# Patient Record
Sex: MALE | Race: WHITE | Employment: FULL TIME | URBAN - METROPOLITAN AREA
[De-identification: names, ages, dates, MRNs, and addresses within clinical notes are randomized per-mention and may not be internally consistent; named-entity substitution may affect disease eponyms.]

---

## 2023-10-31 ENCOUNTER — OFFICE VISIT (OUTPATIENT)
Age: 58
End: 2023-10-31
Payer: COMMERCIAL

## 2023-10-31 VITALS
WEIGHT: 236.8 LBS | TEMPERATURE: 98.6 F | RESPIRATION RATE: 18 BRPM | HEIGHT: 63 IN | BODY MASS INDEX: 41.96 KG/M2 | HEART RATE: 73 BPM | OXYGEN SATURATION: 97 %

## 2023-10-31 DIAGNOSIS — Z12.5 SCREENING FOR PROSTATE CANCER: ICD-10-CM

## 2023-10-31 DIAGNOSIS — R05.8 POST-VIRAL COUGH SYNDROME: ICD-10-CM

## 2023-10-31 DIAGNOSIS — I10 PRIMARY HYPERTENSION: Primary | ICD-10-CM

## 2023-10-31 DIAGNOSIS — Z76.89 ENCOUNTER TO ESTABLISH CARE: ICD-10-CM

## 2023-10-31 DIAGNOSIS — E66.01 CLASS 3 SEVERE OBESITY DUE TO EXCESS CALORIES WITHOUT SERIOUS COMORBIDITY WITH BODY MASS INDEX (BMI) OF 40.0 TO 44.9 IN ADULT (HCC): ICD-10-CM

## 2023-10-31 PROCEDURE — 99204 OFFICE O/P NEW MOD 45 MIN: CPT | Performed by: NURSE PRACTITIONER

## 2023-10-31 RX ORDER — BENZONATATE 200 MG/1
200 CAPSULE ORAL 3 TIMES DAILY PRN
Qty: 30 CAPSULE | Refills: 1 | Status: SHIPPED | OUTPATIENT
Start: 2023-10-31

## 2023-10-31 RX ORDER — ZOSTER VACCINE RECOMBINANT, ADJUVANTED 50 MCG/0.5
0.5 KIT INTRAMUSCULAR SEE ADMIN INSTRUCTIONS
Qty: 0.5 ML | Refills: 0 | Status: SHIPPED | OUTPATIENT
Start: 2023-10-31 | End: 2024-04-28

## 2023-10-31 RX ORDER — VALSARTAN 320 MG/1
320 TABLET ORAL DAILY
COMMUNITY
Start: 2023-09-07

## 2023-10-31 RX ORDER — HYDROCHLOROTHIAZIDE 12.5 MG/1
12.5 CAPSULE, GELATIN COATED ORAL EVERY MORNING
Qty: 90 CAPSULE | Refills: 1 | Status: SHIPPED | OUTPATIENT
Start: 2023-10-31

## 2023-10-31 ASSESSMENT — PATIENT HEALTH QUESTIONNAIRE - PHQ9
SUM OF ALL RESPONSES TO PHQ QUESTIONS 1-9: 0
SUM OF ALL RESPONSES TO PHQ QUESTIONS 1-9: 0
1. LITTLE INTEREST OR PLEASURE IN DOING THINGS: 0
SUM OF ALL RESPONSES TO PHQ9 QUESTIONS 1 & 2: 0
2. FEELING DOWN, DEPRESSED OR HOPELESS: 0
SUM OF ALL RESPONSES TO PHQ QUESTIONS 1-9: 0
SUM OF ALL RESPONSES TO PHQ QUESTIONS 1-9: 0

## 2023-10-31 ASSESSMENT — ENCOUNTER SYMPTOMS
SHORTNESS OF BREATH: 0
WHEEZING: 0
COUGH: 1
CHEST TIGHTNESS: 0
SORE THROAT: 0

## 2024-02-07 ENCOUNTER — OFFICE VISIT (OUTPATIENT)
Age: 59
End: 2024-02-07
Payer: COMMERCIAL

## 2024-02-07 VITALS
DIASTOLIC BLOOD PRESSURE: 71 MMHG | OXYGEN SATURATION: 95 % | RESPIRATION RATE: 18 BRPM | WEIGHT: 235.6 LBS | HEART RATE: 99 BPM | BODY MASS INDEX: 40.22 KG/M2 | TEMPERATURE: 98.8 F | SYSTOLIC BLOOD PRESSURE: 136 MMHG | HEIGHT: 64 IN

## 2024-02-07 DIAGNOSIS — J01.10 ACUTE NON-RECURRENT FRONTAL SINUSITIS: ICD-10-CM

## 2024-02-07 DIAGNOSIS — J40 BRONCHITIS: Primary | ICD-10-CM

## 2024-02-07 PROCEDURE — 3078F DIAST BP <80 MM HG: CPT | Performed by: INTERNAL MEDICINE

## 2024-02-07 PROCEDURE — 99213 OFFICE O/P EST LOW 20 MIN: CPT | Performed by: INTERNAL MEDICINE

## 2024-02-07 PROCEDURE — 3075F SYST BP GE 130 - 139MM HG: CPT | Performed by: INTERNAL MEDICINE

## 2024-02-07 RX ORDER — DOXYCYCLINE HYCLATE 100 MG
100 TABLET ORAL 2 TIMES DAILY
Qty: 14 TABLET | Refills: 0 | Status: SHIPPED | OUTPATIENT
Start: 2024-02-07 | End: 2024-02-14

## 2024-02-07 RX ORDER — ALBUTEROL SULFATE 90 UG/1
2 AEROSOL, METERED RESPIRATORY (INHALATION) EVERY 6 HOURS PRN
Qty: 1 EACH | Refills: 5 | Status: SHIPPED | OUTPATIENT
Start: 2024-02-07

## 2024-02-07 SDOH — ECONOMIC STABILITY: HOUSING INSECURITY
IN THE LAST 12 MONTHS, WAS THERE A TIME WHEN YOU DID NOT HAVE A STEADY PLACE TO SLEEP OR SLEPT IN A SHELTER (INCLUDING NOW)?: NO

## 2024-02-07 SDOH — ECONOMIC STABILITY: INCOME INSECURITY: HOW HARD IS IT FOR YOU TO PAY FOR THE VERY BASICS LIKE FOOD, HOUSING, MEDICAL CARE, AND HEATING?: NOT HARD AT ALL

## 2024-02-07 SDOH — ECONOMIC STABILITY: FOOD INSECURITY: WITHIN THE PAST 12 MONTHS, THE FOOD YOU BOUGHT JUST DIDN'T LAST AND YOU DIDN'T HAVE MONEY TO GET MORE.: NEVER TRUE

## 2024-02-07 ASSESSMENT — PATIENT HEALTH QUESTIONNAIRE - PHQ9
SUM OF ALL RESPONSES TO PHQ9 QUESTIONS 1 & 2: 0
2. FEELING DOWN, DEPRESSED OR HOPELESS: 0
SUM OF ALL RESPONSES TO PHQ QUESTIONS 1-9: 0
1. LITTLE INTEREST OR PLEASURE IN DOING THINGS: 0

## 2024-02-07 ASSESSMENT — ENCOUNTER SYMPTOMS
SHORTNESS OF BREATH: 0
SINUS PAIN: 0
TROUBLE SWALLOWING: 0
RHINORRHEA: 1
EYE PAIN: 0
COUGH: 1

## 2024-02-07 NOTE — PROGRESS NOTES
Yeison Li is a 59 y.o. 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 Saturday he started feeling worse and was Covid -. He has had a temperature from . He is experiencing a productive cough and it was green and the congestion in his nasal cavity was clear and now it's switched. Clear mucus in his chest and green in his nasal cavity, this started happening on Sunday. Patient has been taking Coricidin in the day and night quil at night.        Vitals:    02/07/24 1049   BP: 136/71   Pulse: 99   Resp: 18   Temp: 98.8 °F (37.1 °C)   SpO2: 95%          HPI  Mr.James JINA Li presents to clinic due to low grade fever, sinus headache, nasal congestion with green mucous and cough productive of green phlegm, and post nasal drainage since Friday. He was tested for covid at home nad it it negative x 2. He reports slight muscle ache that improve, denies joint pain. Since Friday, low grade fever, nasaol congestion , sinus pressure     His co worker tested positive for Covid before his symptoms started.     Past Medical History:   Diagnosis Date    Hypertension             ROS  Review of Systems   Constitutional:  Negative for fever.   HENT:  Positive for congestion, postnasal drip and rhinorrhea. Negative for ear pain, sinus pain and trouble swallowing.    Eyes:  Negative for pain.   Respiratory:  Positive for cough. Negative for shortness of breath.    Cardiovascular:  Negative for chest pain.   Skin:  Negative for rash.   Allergic/Immunologic: Negative for environmental allergies.   Neurological:  Positive for headaches. Negative for dizziness.   Hematological:  Negative for adenopathy.   All other systems reviewed and are negative.          EXAM  Physical Exam  Vitals and nursing note reviewed.   Constitutional:       General: He is not in acute distress.     Appearance: Normal appearance. He is

## 2024-02-07 NOTE — PROGRESS NOTES
I have reviewed all needed documentation in preparation for visit. 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 Saturday he started feeling worse and was Covid -. He has had a temperature from . He is experiencing a productive cough and it was green and the congestion in his nasal cavity was clear and now it's switched. Clear mucus in his chest and green in his nasal cavity, this started happening on Sunday. Patient has been taking Coricidin in the day and night quil at night.        There were no vitals filed for this visit.    Health Maintenance Due   Topic Date Due    COVID-19 Vaccine (1) Never done    HIV screen  Never done    Hepatitis C screen  Never done    DTaP/Tdap/Td vaccine (1 - Tdap) Never done    Diabetes screen  Never done    Lipids  Never done    Colorectal Cancer Screen  Never done    Shingles vaccine (1 of 2) Never done    Flu vaccine (1) Never done    Respiratory Syncytial Virus (RSV) Pregnant or age 60 yrs+ (1 - 1-dose 60+ series) Never done       1. \"Have you been to the ER, urgent care clinic since your last visit?  Hospitalized since your last visit?\" No    2. \"Have you seen or consulted any other health care providers outside of the [facility] since your last visit?\" No    3. For patients aged 45-75: Has the patient had a colonoscopy / FIT/ Cologuard? No, needs to schedule.       If the patient is female:    4. For patients aged 40-74: Has the patient had a mammogram within the past 2 years? NA      5. For patients aged 21-65: Has the patient had a pap smear? PRATIBHA Cespedes

## 2024-03-06 DIAGNOSIS — Z20.822 CONTACT WITH AND (SUSPECTED) EXPOSURE TO COVID-19: ICD-10-CM

## 2024-03-08 NOTE — TELEPHONE ENCOUNTER
Refill request received from [facility] for   Requested Prescriptions     Pending Prescriptions Disp Refills    valsartan (DIOVAN) 320 MG tablet [Pharmacy Med Name: VALSARTAN 320 MG TABLET] 90 tablet 3     Sig: TAKE 1 TABLET BY MOUTH EVERY DAY     Last office visit: 2/7/2024   Next office visit: 3/8/2024     Routed to MARTINA Ladd for review.

## 2024-03-12 RX ORDER — VALSARTAN 320 MG/1
320 TABLET ORAL DAILY
Qty: 90 TABLET | Refills: 3 | Status: SHIPPED | OUTPATIENT
Start: 2024-03-12

## 2024-03-27 DIAGNOSIS — M23.51 RECURRENT RIGHT KNEE INSTABILITY: Primary | ICD-10-CM

## 2024-04-29 RX ORDER — HYDROCHLOROTHIAZIDE 12.5 MG/1
12.5 CAPSULE, GELATIN COATED ORAL EVERY MORNING
Qty: 90 CAPSULE | Refills: 3 | Status: SHIPPED | OUTPATIENT
Start: 2024-04-29

## 2024-06-22 ENCOUNTER — PATIENT MESSAGE (OUTPATIENT)
Age: 59
End: 2024-06-22

## 2024-06-22 DIAGNOSIS — M10.00 ACUTE IDIOPATHIC GOUT, UNSPECIFIED SITE: Primary | ICD-10-CM

## 2024-06-25 RX ORDER — INDOMETHACIN 50 MG/1
50 CAPSULE ORAL 3 TIMES DAILY
Qty: 21 CAPSULE | Refills: 0 | Status: SHIPPED | OUTPATIENT
Start: 2024-06-25 | End: 2024-07-02

## 2025-03-07 DIAGNOSIS — Z20.822 CONTACT WITH AND (SUSPECTED) EXPOSURE TO COVID-19: ICD-10-CM

## 2025-03-10 RX ORDER — VALSARTAN 320 MG/1
320 TABLET ORAL DAILY
Qty: 90 TABLET | Refills: 0 | Status: SHIPPED | OUTPATIENT
Start: 2025-03-10

## 2025-06-10 DIAGNOSIS — Z20.822 CONTACT WITH AND (SUSPECTED) EXPOSURE TO COVID-19: ICD-10-CM

## 2025-06-10 RX ORDER — VALSARTAN 320 MG/1
320 TABLET ORAL DAILY
Qty: 30 TABLET | Refills: 0 | Status: SHIPPED | OUTPATIENT
Start: 2025-06-10

## 2025-06-10 NOTE — TELEPHONE ENCOUNTER
Refill request received from [facility] for   Requested Prescriptions     Pending Prescriptions Disp Refills    valsartan (DIOVAN) 320 MG tablet [Pharmacy Med Name: VALSARTAN 320 MG TABLET] 90 tablet 0     Sig: TAKE 1 TABLET BY MOUTH EVERY DAY     Last office visit: 2/7/2024   Next office visit: Visit date not found     Routed to MARTINA Ladd for review.

## 2025-07-02 DIAGNOSIS — Z20.822 CONTACT WITH AND (SUSPECTED) EXPOSURE TO COVID-19: ICD-10-CM

## 2025-07-02 RX ORDER — VALSARTAN 320 MG/1
320 TABLET ORAL DAILY
Qty: 90 TABLET | Refills: 1 | OUTPATIENT
Start: 2025-07-02

## 2025-07-06 DIAGNOSIS — Z20.822 CONTACT WITH AND (SUSPECTED) EXPOSURE TO COVID-19: ICD-10-CM

## 2025-07-07 RX ORDER — VALSARTAN 320 MG/1
320 TABLET ORAL DAILY
Qty: 30 TABLET | Refills: 0 | Status: SHIPPED | OUTPATIENT
Start: 2025-07-07

## 2025-08-05 DIAGNOSIS — Z20.822 CONTACT WITH AND (SUSPECTED) EXPOSURE TO COVID-19: ICD-10-CM

## 2025-08-05 RX ORDER — VALSARTAN 320 MG/1
320 TABLET ORAL DAILY
Qty: 30 TABLET | Refills: 0 | Status: SHIPPED | OUTPATIENT
Start: 2025-08-05